# Patient Record
Sex: FEMALE | Race: WHITE | Employment: FULL TIME | URBAN - METROPOLITAN AREA
[De-identification: names, ages, dates, MRNs, and addresses within clinical notes are randomized per-mention and may not be internally consistent; named-entity substitution may affect disease eponyms.]

---

## 2018-09-06 ENCOUNTER — HOSPITAL ENCOUNTER (EMERGENCY)
Age: 33
Discharge: HOME | End: 2018-09-06
Payer: COMMERCIAL

## 2018-09-06 VITALS
BODY MASS INDEX: 21.21 KG/M2 | RESPIRATION RATE: 16 BRPM | TEMPERATURE: 98.4 F | OXYGEN SATURATION: 100 % | HEIGHT: 66 IN | WEIGHT: 132 LBS | DIASTOLIC BLOOD PRESSURE: 95 MMHG | HEART RATE: 98 BPM | SYSTOLIC BLOOD PRESSURE: 127 MMHG

## 2018-09-06 DIAGNOSIS — R20.2 NUMBNESS AND TINGLING OF BOTH LEGS: Primary | ICD-10-CM

## 2018-09-06 DIAGNOSIS — R20.0 NUMBNESS AND TINGLING OF BOTH LEGS: Primary | ICD-10-CM

## 2018-09-06 PROCEDURE — 99282 EMERGENCY DEPT VISIT SF MDM: CPT

## 2018-09-06 RX ORDER — DEXTROAMPHETAMINE SACCHARATE, AMPHETAMINE ASPARTATE MONOHYDRATE, DEXTROAMPHETAMINE SULFATE AND AMPHETAMINE SULFATE 5; 5; 5; 5 MG/1; MG/1; MG/1; MG/1
20 CAPSULE, EXTENDED RELEASE ORAL EVERY MORNING
COMMUNITY

## 2018-09-06 RX ORDER — SPIRONOLACTONE 50 MG/1
50 TABLET, FILM COATED ORAL DAILY
COMMUNITY

## 2018-09-06 ASSESSMENT — PAIN SCALES - GENERAL
PAINLEVEL_OUTOF10: 3
PAINLEVEL_OUTOF10: 5

## 2018-09-06 ASSESSMENT — PAIN DESCRIPTION - LOCATION
LOCATION: HEAD
LOCATION: HEAD

## 2018-09-06 ASSESSMENT — PAIN DESCRIPTION - PAIN TYPE
TYPE: ACUTE PAIN
TYPE: ACUTE PAIN

## 2018-09-06 ASSESSMENT — PAIN DESCRIPTION - DESCRIPTORS: DESCRIPTORS: HEADACHE

## 2018-09-07 NOTE — ED NOTES
--Patient provided with discharge instructions and any prescriptions. --Instructions, dosing, and follow-up appointments reviewed with patient/family. No further questions or needs at this time. --Vital signs and patient stable upon discharge. --Patient ambulatory to [facility].        Alexa Sierra RN  09/06/18 2230

## 2018-09-10 NOTE — ED PROVIDER NOTES
[address] [place] [address]  ED  eMERGENCY dEPARTMENT eNCOUnter        Pt Name: Scooter Newby  MRN: [id number]  [place] 1985  Date of evaluation: 9/6/2018  Provider: Lauralyn Sever, APRN - CNP-C  PCP: No primary care provider on file. History provided by the patient. CHIEF COMPLAINT:     Chief Complaint   Patient presents with    Migraine     history of migriane started this morning     Tingling     in legs since this morning. has a bulging disc in back started around 0930       HISTORY OF PRESENT ILLNESS:      Scooter Newby is a 35 y.o. female who presents to [address] [place] [address]  ED with complaints of Tingling and numbness in her legs bilaterally. Patient states that she has a history of migraines, states that she had a migraine at work today, she started doing some yoga. Patient states that she noticed that she was having some tingling to bilateral lower extremities. She states that mostly in her anterior thighs. Patient states that the headache has subsided, she states that she does that she has a bulging disc in her back. Denies any saddle anesthesia, denies any numbness, denies any loss control of bowel or bladder. Patient states that she was concerned about a possible stroke. She's here for further evaluation. Nursing Notes were reviewed     REVIEW OF SYSTEMS:     Review of Systems  All systems, a total of 10, are reviewed and negative except for those that were just noted in history present illness. PAST MEDICAL HISTORY:     Past Medical History:   Diagnosis Date    Bulging lumbar disc     Headache          SURGICAL HISTORY:    History reviewed. No pertinent surgical history.       CURRENT MEDICATIONS:       Discharge Medication List as of 9/6/2018 11:20 PM      CONTINUE these medications which have NOT CHANGED    Details   spironolactone (ALDACTONE) 50 MG tablet Take 50 mg by mouth dailyHistorical Med      amphetamine-dextroamphetamine (ADDERALL XR) 20 MG the patient in regards to following up with neurology for her chronic headaches. Patient verbalized understanding this. I did not feel that any imaging was necessary today. Patient was agreeable with the plan. Patient laboratory studies, radiographic imaging, and assessment were all discussed with the patient and/or patient family. There was shared decision-making between myself as well as the patient and/or their surrogate and we are all in agreement with discharge home. There was an opportunity for questions and all questions were answered to the best of my ability and to the satisfaction of the patient and/or patient family. FINAL IMPRESSION:      1.  Numbness and tingling of both legs          DISPOSITION/PLAN:   DISPOSITION Decision To Discharge      PATIENT REFERRED TO:  [facility]  ED  [address]  Go to   If symptoms worsen      DISCHARGE MEDICATIONS:  Discharge Medication List as of 9/6/2018 11:20 PM                     (Please note that portions of this note were completed with a voice recognition program.  Efforts were made to edit the dictations, but occasionally words are mis-transcribed.)    ROBBY King CNP-C (electronically signed)        ROBBY King CNP  09/10/18 [id number]